# Patient Record
Sex: MALE | Race: WHITE | URBAN - METROPOLITAN AREA
[De-identification: names, ages, dates, MRNs, and addresses within clinical notes are randomized per-mention and may not be internally consistent; named-entity substitution may affect disease eponyms.]

---
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
## 2019-05-14 ENCOUNTER — APPOINTMENT (OUTPATIENT)
Age: 19
Setting detail: DERMATOLOGY
End: 2019-05-14

## 2019-05-14 DIAGNOSIS — R21 RASH AND OTHER NONSPECIFIC SKIN ERUPTION: ICD-10-CM

## 2019-05-14 DIAGNOSIS — L71.8 OTHER ROSACEA: ICD-10-CM

## 2019-05-14 PROCEDURE — OTHER COUNSELING: OTHER

## 2019-05-14 PROCEDURE — OTHER MIPS QUALITY: OTHER

## 2019-05-14 PROCEDURE — OTHER TREATMENT REGIMEN: OTHER

## 2019-05-14 PROCEDURE — OTHER PRESCRIPTION: OTHER

## 2019-05-14 PROCEDURE — 99213 OFFICE O/P EST LOW 20 MIN: CPT

## 2019-05-14 RX ORDER — SULFACETAMIDE SODIUM 10 MG/ML
LOTION TOPICAL
Qty: 1 | Refills: 3 | Status: ERX | COMMUNITY
Start: 2019-05-14

## 2019-05-14 RX ORDER — HYDROCORTISONE 25 MG/G
CREAM TOPICAL
Qty: 1 | Refills: 0 | Status: ERX | COMMUNITY
Start: 2019-05-14

## 2019-05-14 ASSESSMENT — LOCATION DETAILED DESCRIPTION DERM
LOCATION DETAILED: LEFT MEDIAL MALAR CHEEK
LOCATION DETAILED: RIGHT SUPERIOR ANTERIOR NECK
LOCATION DETAILED: LEFT SUPERIOR ANTERIOR NECK
LOCATION DETAILED: LEFT CENTRAL MALAR CHEEK
LOCATION DETAILED: RIGHT CENTRAL MALAR CHEEK

## 2019-05-14 ASSESSMENT — LOCATION ZONE DERM
LOCATION ZONE: FACE
LOCATION ZONE: NECK

## 2019-05-14 ASSESSMENT — LOCATION SIMPLE DESCRIPTION DERM
LOCATION SIMPLE: RIGHT ANTERIOR NECK
LOCATION SIMPLE: LEFT ANTERIOR NECK
LOCATION SIMPLE: LEFT CHEEK
LOCATION SIMPLE: RIGHT CHEEK

## 2019-05-14 NOTE — HPI: RASH (ROSACEA)
How Severe Is Your Rosacea?: mild
Is This A New Presentation, Or A Follow-Up?: Rash
Additional History: Cerave AM, cerave foaming wash

## 2019-05-14 NOTE — PROCEDURE: TREATMENT REGIMEN
Detail Level: Zone
Samples Given: La Roche Posay Mineral SPF 50
Samples Given: La Roche Posay SPF 50 Mineral
Initiate Treatment: Hydrocortisone 2.5% cream BID for 5-7 days at most prn flares
Discontinue Regimen: Neutrogena spf 70
Plan: I spoke to the patient that he appears to be sunburned however he stated he is not- except under his eyes a little. He uses a  by Neutrogena. He stated the redness was worse a few days ago. Reviewed with the patient he could be having a reaction to his Neutrogena sunscreen (which he stated he has used for years) or have Rosacea that is progressing or he could be having a photosensitivity reation. I suggested he change to a chemical free sunscreen and ordered an NARGIS on him

## 2019-06-19 ENCOUNTER — RX ONLY (RX ONLY)
Age: 19
End: 2019-06-19

## 2019-06-19 RX ORDER — ADAPALENE AND BENZOYL PEROXIDE 3; 25 MG/G; MG/G
GEL TOPICAL
Qty: 1 | Refills: 1 | Status: ERX

## 2019-06-26 ENCOUNTER — RX ONLY (RX ONLY)
Age: 19
End: 2019-06-26

## 2019-06-26 RX ORDER — ADAPALENE 3 MG/G
GEL TOPICAL
Qty: 1 | Refills: 2 | Status: ERX | COMMUNITY
Start: 2019-06-26